# Patient Record
Sex: FEMALE | Race: BLACK OR AFRICAN AMERICAN | NOT HISPANIC OR LATINO | URBAN - METROPOLITAN AREA
[De-identification: names, ages, dates, MRNs, and addresses within clinical notes are randomized per-mention and may not be internally consistent; named-entity substitution may affect disease eponyms.]

---

## 2021-07-07 ENCOUNTER — OUTPATIENT (OUTPATIENT)
Dept: OUTPATIENT SERVICES | Facility: HOSPITAL | Age: 36
LOS: 1 days | Discharge: HOME | End: 2021-07-07

## 2021-07-07 ENCOUNTER — NON-APPOINTMENT (OUTPATIENT)
Age: 36
End: 2021-07-07

## 2021-07-07 ENCOUNTER — APPOINTMENT (OUTPATIENT)
Dept: OBGYN | Facility: CLINIC | Age: 36
End: 2021-07-07
Payer: MEDICAID

## 2021-07-07 ENCOUNTER — RESULT CHARGE (OUTPATIENT)
Age: 36
End: 2021-07-07

## 2021-07-07 VITALS
WEIGHT: 232 LBS | SYSTOLIC BLOOD PRESSURE: 120 MMHG | BODY MASS INDEX: 35.16 KG/M2 | DIASTOLIC BLOOD PRESSURE: 64 MMHG | HEIGHT: 68 IN

## 2021-07-07 DIAGNOSIS — Z34.90 ENCOUNTER FOR SUPERVISION OF NORMAL PREGNANCY, UNSPECIFIED, UNSPECIFIED TRIMESTER: ICD-10-CM

## 2021-07-07 PROCEDURE — 99204 OFFICE O/P NEW MOD 45 MIN: CPT

## 2021-07-08 ENCOUNTER — NON-APPOINTMENT (OUTPATIENT)
Age: 36
End: 2021-07-08

## 2021-07-08 LAB
BILIRUB UR QL STRIP: NORMAL
CLARITY UR: CLEAR
COLLECTION METHOD: NORMAL
GLUCOSE UR-MCNC: NORMAL
HCG UR QL: 0.2 EU/DL
HGB UR QL STRIP.AUTO: NORMAL
KETONES UR-MCNC: NORMAL
LEUKOCYTE ESTERASE UR QL STRIP: NORMAL
NITRITE UR QL STRIP: NORMAL
PH UR STRIP: 6
PROT UR STRIP-MCNC: NORMAL
SP GR UR STRIP: 1.02

## 2021-07-17 LAB
A VAGINAE DNA VAG QL NAA+PROBE: NORMAL
BVAB2 DNA VAG QL NAA+PROBE: NORMAL
C KRUSEI DNA VAG QL NAA+PROBE: NEGATIVE
C TRACH RRNA SPEC QL NAA+PROBE: NEGATIVE
CYTOLOGY CVX/VAG DOC THIN PREP: NORMAL
MEGA1 DNA VAG QL NAA+PROBE: NORMAL
N GONORRHOEA RRNA SPEC QL NAA+PROBE: NEGATIVE
T VAGINALIS RRNA SPEC QL NAA+PROBE: NEGATIVE

## 2021-07-21 ENCOUNTER — NON-APPOINTMENT (OUTPATIENT)
Age: 36
End: 2021-07-21

## 2021-07-21 ENCOUNTER — APPOINTMENT (OUTPATIENT)
Dept: OBGYN | Facility: CLINIC | Age: 36
End: 2021-07-21
Payer: MEDICAID

## 2021-07-21 ENCOUNTER — OUTPATIENT (OUTPATIENT)
Dept: OUTPATIENT SERVICES | Facility: HOSPITAL | Age: 36
LOS: 1 days | Discharge: HOME | End: 2021-07-21

## 2021-07-21 ENCOUNTER — RESULT CHARGE (OUTPATIENT)
Age: 36
End: 2021-07-21

## 2021-07-21 VITALS — SYSTOLIC BLOOD PRESSURE: 118 MMHG | DIASTOLIC BLOOD PRESSURE: 64 MMHG

## 2021-07-21 VITALS
WEIGHT: 236.5 LBS | BODY MASS INDEX: 35.84 KG/M2 | SYSTOLIC BLOOD PRESSURE: 142 MMHG | DIASTOLIC BLOOD PRESSURE: 68 MMHG | HEIGHT: 68 IN

## 2021-07-21 PROCEDURE — 99213 OFFICE O/P EST LOW 20 MIN: CPT | Mod: 25

## 2021-07-21 PROCEDURE — 76815 OB US LIMITED FETUS(S): CPT | Mod: 26

## 2021-07-26 ENCOUNTER — OUTPATIENT (OUTPATIENT)
Dept: OUTPATIENT SERVICES | Facility: HOSPITAL | Age: 36
LOS: 1 days | Discharge: HOME | End: 2021-07-26

## 2021-07-26 ENCOUNTER — ASOB RESULT (OUTPATIENT)
Age: 36
End: 2021-07-26

## 2021-07-26 ENCOUNTER — APPOINTMENT (OUTPATIENT)
Dept: ANTEPARTUM | Facility: CLINIC | Age: 36
End: 2021-07-26
Payer: MEDICAID

## 2021-07-26 DIAGNOSIS — Z34.90 ENCOUNTER FOR SUPERVISION OF NORMAL PREGNANCY, UNSPECIFIED, UNSPECIFIED TRIMESTER: ICD-10-CM

## 2021-07-26 PROCEDURE — 76805 OB US >/= 14 WKS SNGL FETUS: CPT | Mod: 26

## 2021-07-28 ENCOUNTER — NON-APPOINTMENT (OUTPATIENT)
Age: 36
End: 2021-07-28

## 2021-07-28 LAB
ABO + RH PNL BLD: NORMAL
BACTERIA UR CULT: NORMAL
BLD GP AB SCN SERPL QL: NORMAL
GLUCOSE 1H P 50 G GLC PO SERPL-MCNC: 159 MG/DL
HBV SURFACE AG SER QL: NONREACTIVE
HIV1+2 AB SPEC QL IA.RAPID: NONREACTIVE
M TB IFN-G BLD-IMP: NEGATIVE
MEV IGG FLD QL IA: 109 AU/ML
MEV IGG+IGM SER-IMP: POSITIVE
QUANTIFERON TB PLUS MITOGEN MINUS NIL: 1.78 IU/ML
QUANTIFERON TB PLUS NIL: 0.01 IU/ML
QUANTIFERON TB PLUS TB1 MINUS NIL: 0 IU/ML
QUANTIFERON TB PLUS TB2 MINUS NIL: 0 IU/ML
RUBV IGG FLD-ACNC: 5.9 INDEX
RUBV IGG SER-IMP: POSITIVE
VZV AB TITR SER: POSITIVE
VZV IGG SER IF-ACNC: 1591 INDEX

## 2021-07-30 LAB — T PALLIDUM AB SER QL IA: NEGATIVE

## 2021-08-01 LAB
AR GENE MUT ANL BLD/T: NORMAL
FMR1 GENE MUT ANL BLD/T: NORMAL

## 2021-08-04 ENCOUNTER — APPOINTMENT (OUTPATIENT)
Dept: OBGYN | Facility: CLINIC | Age: 36
End: 2021-08-04
Payer: MEDICAID

## 2021-08-04 ENCOUNTER — NON-APPOINTMENT (OUTPATIENT)
Age: 36
End: 2021-08-04

## 2021-08-04 ENCOUNTER — OUTPATIENT (OUTPATIENT)
Dept: OUTPATIENT SERVICES | Facility: HOSPITAL | Age: 36
LOS: 1 days | Discharge: HOME | End: 2021-08-04

## 2021-08-04 VITALS
WEIGHT: 233 LBS | BODY MASS INDEX: 35.31 KG/M2 | SYSTOLIC BLOOD PRESSURE: 100 MMHG | HEIGHT: 68 IN | DIASTOLIC BLOOD PRESSURE: 60 MMHG

## 2021-08-04 PROCEDURE — 99213 OFFICE O/P EST LOW 20 MIN: CPT

## 2021-08-05 LAB
CFTR MUT TESTED BLD/T: NEGATIVE
GLUCOSE 1H P 100 G GLC PO SERPL-MCNC: 154 MG/DL
GLUCOSE 2H P CHAL SERPL-MCNC: 135 MG/DL
GLUCOSE 3H P CHAL SERPL-MCNC: 130 MG/DL
GLUCOSE BS SERPL-MCNC: 82 MG/DL

## 2021-08-18 ENCOUNTER — RESULT CHARGE (OUTPATIENT)
Age: 36
End: 2021-08-18

## 2021-08-18 ENCOUNTER — APPOINTMENT (OUTPATIENT)
Dept: OBGYN | Facility: CLINIC | Age: 36
End: 2021-08-18
Payer: MEDICAID

## 2021-08-18 ENCOUNTER — OUTPATIENT (OUTPATIENT)
Dept: OUTPATIENT SERVICES | Facility: HOSPITAL | Age: 36
LOS: 1 days | Discharge: HOME | End: 2021-08-18

## 2021-08-18 VITALS
BODY MASS INDEX: 35.92 KG/M2 | DIASTOLIC BLOOD PRESSURE: 62 MMHG | HEIGHT: 68 IN | WEIGHT: 237 LBS | SYSTOLIC BLOOD PRESSURE: 108 MMHG

## 2021-08-18 LAB
BILIRUB UR QL STRIP: NORMAL
CLARITY UR: CLEAR
COLLECTION METHOD: NORMAL
GLUCOSE UR-MCNC: NORMAL
HCG UR QL: 0.2 EU/DL
HGB UR QL STRIP.AUTO: NORMAL
KETONES UR-MCNC: NORMAL
LEUKOCYTE ESTERASE UR QL STRIP: NORMAL
NITRITE UR QL STRIP: NORMAL
PH UR STRIP: 5
PROT UR STRIP-MCNC: NORMAL
SP GR UR STRIP: 1

## 2021-08-18 PROCEDURE — 99213 OFFICE O/P EST LOW 20 MIN: CPT

## 2021-08-20 LAB
GP B STREP DNA SPEC QL NAA+PROBE: NORMAL
GP B STREP DNA SPEC QL NAA+PROBE: NOT DETECTED
SOURCE GBS: NORMAL

## 2021-08-23 DIAGNOSIS — Z34.90 ENCOUNTER FOR SUPERVISION OF NORMAL PREGNANCY, UNSPECIFIED, UNSPECIFIED TRIMESTER: ICD-10-CM

## 2021-08-23 LAB
BASOPHILS # BLD AUTO: 0.02 K/UL
BASOPHILS NFR BLD AUTO: 0.3 %
EOSINOPHIL # BLD AUTO: 0.04 K/UL
EOSINOPHIL NFR BLD AUTO: 0.7 %
HCT VFR BLD CALC: 37.5 %
HGB BLD-MCNC: 11.8 G/DL
IMM GRANULOCYTES NFR BLD AUTO: 1.3 %
LYMPHOCYTES # BLD AUTO: 1.47 K/UL
LYMPHOCYTES NFR BLD AUTO: 24.5 %
MAN DIFF?: NORMAL
MCHC RBC-ENTMCNC: 26.5 PG
MCHC RBC-ENTMCNC: 31.5 G/DL
MCV RBC AUTO: 84.3 FL
MONOCYTES # BLD AUTO: 0.64 K/UL
MONOCYTES NFR BLD AUTO: 10.7 %
NEUTROPHILS # BLD AUTO: 3.74 K/UL
NEUTROPHILS NFR BLD AUTO: 62.5 %
PLATELET # BLD AUTO: 229 K/UL
RBC # BLD: 4.45 M/UL
RBC # FLD: 14 %
WBC # FLD AUTO: 5.99 K/UL

## 2021-08-25 ENCOUNTER — ASOB RESULT (OUTPATIENT)
Age: 36
End: 2021-08-25

## 2021-08-25 ENCOUNTER — OUTPATIENT (OUTPATIENT)
Dept: OUTPATIENT SERVICES | Facility: HOSPITAL | Age: 36
LOS: 1 days | Discharge: HOME | End: 2021-08-25

## 2021-08-25 ENCOUNTER — RESULT CHARGE (OUTPATIENT)
Age: 36
End: 2021-08-25

## 2021-08-25 ENCOUNTER — APPOINTMENT (OUTPATIENT)
Dept: OBGYN | Facility: CLINIC | Age: 36
End: 2021-08-25
Payer: MEDICAID

## 2021-08-25 ENCOUNTER — APPOINTMENT (OUTPATIENT)
Dept: ANTEPARTUM | Facility: CLINIC | Age: 36
End: 2021-08-25
Payer: MEDICAID

## 2021-08-25 VITALS
HEIGHT: 68 IN | BODY MASS INDEX: 35.92 KG/M2 | SYSTOLIC BLOOD PRESSURE: 108 MMHG | DIASTOLIC BLOOD PRESSURE: 60 MMHG | WEIGHT: 237 LBS

## 2021-08-25 LAB
BILIRUB UR QL STRIP: NORMAL
CLARITY UR: CLEAR
COLLECTION METHOD: NORMAL
GLUCOSE UR-MCNC: NORMAL
HCG UR QL: 0.2 EU/DL
HGB UR QL STRIP.AUTO: NORMAL
KETONES UR-MCNC: NORMAL
LEUKOCYTE ESTERASE UR QL STRIP: NORMAL
NITRITE UR QL STRIP: NORMAL
PH UR STRIP: 6
PROT UR STRIP-MCNC: NORMAL
SP GR UR STRIP: 1.03

## 2021-08-25 PROCEDURE — 76816 OB US FOLLOW-UP PER FETUS: CPT | Mod: 26

## 2021-08-25 PROCEDURE — 99213 OFFICE O/P EST LOW 20 MIN: CPT

## 2021-08-27 DIAGNOSIS — O26.849 UTERINE SIZE-DATE DISCREPANCY, UNSPECIFIED TRIMESTER: ICD-10-CM

## 2021-08-27 DIAGNOSIS — Z36.89 ENCOUNTER FOR OTHER SPECIFIED ANTENATAL SCREENING: ICD-10-CM

## 2021-08-27 DIAGNOSIS — Z3A.36 36 WEEKS GESTATION OF PREGNANCY: ICD-10-CM

## 2021-08-30 DIAGNOSIS — Z36.89 ENCOUNTER FOR OTHER SPECIFIED ANTENATAL SCREENING: ICD-10-CM

## 2021-08-30 DIAGNOSIS — Z34.90 ENCOUNTER FOR SUPERVISION OF NORMAL PREGNANCY, UNSPECIFIED, UNSPECIFIED TRIMESTER: ICD-10-CM

## 2021-08-30 DIAGNOSIS — O26.849 UTERINE SIZE-DATE DISCREPANCY, UNSPECIFIED TRIMESTER: ICD-10-CM

## 2021-08-30 DIAGNOSIS — Z02.9 ENCOUNTER FOR ADMINISTRATIVE EXAMINATIONS, UNSPECIFIED: ICD-10-CM

## 2021-08-30 DIAGNOSIS — Z3A.36 36 WEEKS GESTATION OF PREGNANCY: ICD-10-CM

## 2021-09-01 ENCOUNTER — RESULT CHARGE (OUTPATIENT)
Age: 36
End: 2021-09-01

## 2021-09-01 ENCOUNTER — NON-APPOINTMENT (OUTPATIENT)
Age: 36
End: 2021-09-01

## 2021-09-01 ENCOUNTER — OUTPATIENT (OUTPATIENT)
Dept: OUTPATIENT SERVICES | Facility: HOSPITAL | Age: 36
LOS: 1 days | Discharge: HOME | End: 2021-09-01

## 2021-09-01 ENCOUNTER — APPOINTMENT (OUTPATIENT)
Dept: OBGYN | Facility: CLINIC | Age: 36
End: 2021-09-01
Payer: MEDICAID

## 2021-09-01 VITALS
BODY MASS INDEX: 36.07 KG/M2 | HEIGHT: 68 IN | SYSTOLIC BLOOD PRESSURE: 110 MMHG | DIASTOLIC BLOOD PRESSURE: 70 MMHG | WEIGHT: 238 LBS

## 2021-09-01 LAB
BILIRUB UR QL STRIP: NORMAL
CLARITY UR: CLEAR
COLLECTION METHOD: NORMAL
GLUCOSE UR-MCNC: NORMAL
HCG UR QL: 0.2 EU/DL
HGB UR QL STRIP.AUTO: NORMAL
KETONES UR-MCNC: NORMAL
LEUKOCYTE ESTERASE UR QL STRIP: NORMAL
NITRITE UR QL STRIP: NORMAL
PH UR STRIP: 7
PROT UR STRIP-MCNC: NORMAL
SP GR UR STRIP: 1.01

## 2021-09-01 PROCEDURE — 99213 OFFICE O/P EST LOW 20 MIN: CPT

## 2021-09-07 ENCOUNTER — NON-APPOINTMENT (OUTPATIENT)
Age: 36
End: 2021-09-07

## 2021-09-08 ENCOUNTER — APPOINTMENT (OUTPATIENT)
Dept: OBGYN | Facility: CLINIC | Age: 36
End: 2021-09-08
Payer: MEDICAID

## 2021-09-08 ENCOUNTER — OUTPATIENT (OUTPATIENT)
Dept: OUTPATIENT SERVICES | Facility: HOSPITAL | Age: 36
LOS: 1 days | Discharge: HOME | End: 2021-09-08

## 2021-09-08 ENCOUNTER — RESULT CHARGE (OUTPATIENT)
Age: 36
End: 2021-09-08

## 2021-09-08 VITALS
BODY MASS INDEX: 36.44 KG/M2 | SYSTOLIC BLOOD PRESSURE: 118 MMHG | WEIGHT: 240.44 LBS | DIASTOLIC BLOOD PRESSURE: 66 MMHG | HEIGHT: 68 IN

## 2021-09-08 PROCEDURE — 99213 OFFICE O/P EST LOW 20 MIN: CPT

## 2021-09-11 ENCOUNTER — OUTPATIENT (OUTPATIENT)
Dept: OUTPATIENT SERVICES | Facility: HOSPITAL | Age: 36
LOS: 1 days | Discharge: HOME | End: 2021-09-11

## 2021-09-11 ENCOUNTER — LABORATORY RESULT (OUTPATIENT)
Age: 36
End: 2021-09-11

## 2021-09-11 DIAGNOSIS — Z11.59 ENCOUNTER FOR SCREENING FOR OTHER VIRAL DISEASES: ICD-10-CM

## 2021-09-14 ENCOUNTER — INPATIENT (INPATIENT)
Facility: HOSPITAL | Age: 36
LOS: 1 days | Discharge: HOME | End: 2021-09-16
Attending: OBSTETRICS & GYNECOLOGY | Admitting: OBSTETRICS & GYNECOLOGY
Payer: MEDICAID

## 2021-09-14 ENCOUNTER — NON-APPOINTMENT (OUTPATIENT)
Age: 36
End: 2021-09-14

## 2021-09-14 VITALS — TEMPERATURE: 98 F

## 2021-09-14 DIAGNOSIS — Z98.891 HISTORY OF UTERINE SCAR FROM PREVIOUS SURGERY: Chronic | ICD-10-CM

## 2021-09-14 LAB
ALBUMIN SERPL ELPH-MCNC: 3.6 G/DL — SIGNIFICANT CHANGE UP (ref 3.5–5.2)
ALP SERPL-CCNC: 141 U/L — HIGH (ref 30–115)
ALT FLD-CCNC: 13 U/L — SIGNIFICANT CHANGE UP (ref 0–41)
AMPHET UR-MCNC: NEGATIVE — SIGNIFICANT CHANGE UP
ANION GAP SERPL CALC-SCNC: 12 MMOL/L — SIGNIFICANT CHANGE UP (ref 7–14)
APPEARANCE UR: CLEAR — SIGNIFICANT CHANGE UP
AST SERPL-CCNC: 15 U/L — SIGNIFICANT CHANGE UP (ref 0–41)
BARBITURATES UR SCN-MCNC: NEGATIVE — SIGNIFICANT CHANGE UP
BASOPHILS # BLD AUTO: 0.02 K/UL — SIGNIFICANT CHANGE UP (ref 0–0.2)
BASOPHILS NFR BLD AUTO: 0.3 % — SIGNIFICANT CHANGE UP (ref 0–1)
BENZODIAZ UR-MCNC: NEGATIVE — SIGNIFICANT CHANGE UP
BILIRUB SERPL-MCNC: 0.3 MG/DL — SIGNIFICANT CHANGE UP (ref 0.2–1.2)
BILIRUB UR-MCNC: NEGATIVE — SIGNIFICANT CHANGE UP
BLD GP AB SCN SERPL QL: SIGNIFICANT CHANGE UP
BUN SERPL-MCNC: 7 MG/DL — LOW (ref 10–20)
BUPRENORPHINE SCREEN, URINE RESULT: NEGATIVE — SIGNIFICANT CHANGE UP
CALCIUM SERPL-MCNC: 9.1 MG/DL — SIGNIFICANT CHANGE UP (ref 8.5–10.1)
CHLORIDE SERPL-SCNC: 104 MMOL/L — SIGNIFICANT CHANGE UP (ref 98–110)
CO2 SERPL-SCNC: 19 MMOL/L — SIGNIFICANT CHANGE UP (ref 17–32)
COCAINE METAB.OTHER UR-MCNC: NEGATIVE — SIGNIFICANT CHANGE UP
COLOR SPEC: SIGNIFICANT CHANGE UP
CREAT ?TM UR-MCNC: 68 MG/DL — SIGNIFICANT CHANGE UP
CREAT SERPL-MCNC: 0.5 MG/DL — LOW (ref 0.7–1.5)
DIFF PNL FLD: NEGATIVE — SIGNIFICANT CHANGE UP
EOSINOPHIL # BLD AUTO: 0.02 K/UL — SIGNIFICANT CHANGE UP (ref 0–0.7)
EOSINOPHIL NFR BLD AUTO: 0.3 % — SIGNIFICANT CHANGE UP (ref 0–8)
FENTANYL UR QL: NEGATIVE — SIGNIFICANT CHANGE UP
GLUCOSE SERPL-MCNC: 91 MG/DL — SIGNIFICANT CHANGE UP (ref 70–99)
GLUCOSE UR QL: NEGATIVE — SIGNIFICANT CHANGE UP
HCT VFR BLD CALC: 40.8 % — SIGNIFICANT CHANGE UP (ref 37–47)
HGB BLD-MCNC: 12.8 G/DL — SIGNIFICANT CHANGE UP (ref 12–16)
IMM GRANULOCYTES NFR BLD AUTO: 0.8 % — HIGH (ref 0.1–0.3)
KETONES UR-MCNC: NEGATIVE — SIGNIFICANT CHANGE UP
L&D DRUG SCREEN, URINE: SIGNIFICANT CHANGE UP
LDH SERPL L TO P-CCNC: 147 — SIGNIFICANT CHANGE UP (ref 50–242)
LEUKOCYTE ESTERASE UR-ACNC: NEGATIVE — SIGNIFICANT CHANGE UP
LYMPHOCYTES # BLD AUTO: 1.67 K/UL — SIGNIFICANT CHANGE UP (ref 1.2–3.4)
LYMPHOCYTES # BLD AUTO: 28 % — SIGNIFICANT CHANGE UP (ref 20.5–51.1)
MCHC RBC-ENTMCNC: 25.7 PG — LOW (ref 27–31)
MCHC RBC-ENTMCNC: 31.4 G/DL — LOW (ref 32–37)
MCV RBC AUTO: 81.8 FL — SIGNIFICANT CHANGE UP (ref 81–99)
METHADONE UR-MCNC: NEGATIVE — SIGNIFICANT CHANGE UP
MONOCYTES # BLD AUTO: 0.7 K/UL — HIGH (ref 0.1–0.6)
MONOCYTES NFR BLD AUTO: 11.7 % — HIGH (ref 1.7–9.3)
NEUTROPHILS # BLD AUTO: 3.5 K/UL — SIGNIFICANT CHANGE UP (ref 1.4–6.5)
NEUTROPHILS NFR BLD AUTO: 58.9 % — SIGNIFICANT CHANGE UP (ref 42.2–75.2)
NITRITE UR-MCNC: NEGATIVE — SIGNIFICANT CHANGE UP
NRBC # BLD: 0 /100 WBCS — SIGNIFICANT CHANGE UP (ref 0–0)
OPIATES UR-MCNC: NEGATIVE — SIGNIFICANT CHANGE UP
OXYCODONE UR-MCNC: NEGATIVE — SIGNIFICANT CHANGE UP
PCP UR-MCNC: NEGATIVE — SIGNIFICANT CHANGE UP
PH UR: 7 — SIGNIFICANT CHANGE UP (ref 5–8)
PLATELET # BLD AUTO: 243 K/UL — SIGNIFICANT CHANGE UP (ref 130–400)
POTASSIUM SERPL-MCNC: 4.1 MMOL/L — SIGNIFICANT CHANGE UP (ref 3.5–5)
POTASSIUM SERPL-SCNC: 4.1 MMOL/L — SIGNIFICANT CHANGE UP (ref 3.5–5)
PRENATAL SYPHILIS TEST: SIGNIFICANT CHANGE UP
PROPOXYPHENE QUALITATIVE URINE RESULT: NEGATIVE — SIGNIFICANT CHANGE UP
PROT ?TM UR-MCNC: 11 MG/DLG/24H — SIGNIFICANT CHANGE UP
PROT SERPL-MCNC: 6.4 G/DL — SIGNIFICANT CHANGE UP (ref 6–8)
PROT UR-MCNC: SIGNIFICANT CHANGE UP
PROT/CREAT UR-RTO: 0.2 RATIO — SIGNIFICANT CHANGE UP (ref 0–0.2)
RBC # BLD: 4.99 M/UL — SIGNIFICANT CHANGE UP (ref 4.2–5.4)
RBC # FLD: 14.1 % — SIGNIFICANT CHANGE UP (ref 11.5–14.5)
SODIUM SERPL-SCNC: 135 MMOL/L — SIGNIFICANT CHANGE UP (ref 135–146)
SP GR SPEC: 1.01 — SIGNIFICANT CHANGE UP (ref 1.01–1.03)
URATE SERPL-MCNC: 3.2 MG/DL — SIGNIFICANT CHANGE UP (ref 2.5–7)
UROBILINOGEN FLD QL: SIGNIFICANT CHANGE UP
WBC # BLD: 5.96 K/UL — SIGNIFICANT CHANGE UP (ref 4.8–10.8)
WBC # FLD AUTO: 5.96 K/UL — SIGNIFICANT CHANGE UP (ref 4.8–10.8)

## 2021-09-14 PROCEDURE — 59514 CESAREAN DELIVERY ONLY: CPT | Mod: U9

## 2021-09-14 PROCEDURE — 58300 INSERT INTRAUTERINE DEVICE: CPT | Mod: 59

## 2021-09-14 RX ORDER — ONDANSETRON 8 MG/1
4 TABLET, FILM COATED ORAL ONCE
Refills: 0 | Status: DISCONTINUED | OUTPATIENT
Start: 2021-09-14 | End: 2021-09-14

## 2021-09-14 RX ORDER — SODIUM CHLORIDE 9 MG/ML
1000 INJECTION, SOLUTION INTRAVENOUS
Refills: 0 | Status: DISCONTINUED | OUTPATIENT
Start: 2021-09-14 | End: 2021-09-16

## 2021-09-14 RX ORDER — SIMETHICONE 80 MG/1
80 TABLET, CHEWABLE ORAL EVERY 4 HOURS
Refills: 0 | Status: DISCONTINUED | OUTPATIENT
Start: 2021-09-14 | End: 2021-09-16

## 2021-09-14 RX ORDER — OXYCODONE HYDROCHLORIDE 5 MG/1
5 TABLET ORAL
Refills: 0 | Status: DISCONTINUED | OUTPATIENT
Start: 2021-09-14 | End: 2021-09-16

## 2021-09-14 RX ORDER — DIPHENHYDRAMINE HCL 50 MG
25 CAPSULE ORAL EVERY 6 HOURS
Refills: 0 | Status: DISCONTINUED | OUTPATIENT
Start: 2021-09-14 | End: 2021-09-16

## 2021-09-14 RX ORDER — CEFAZOLIN SODIUM 1 G
2000 VIAL (EA) INJECTION ONCE
Refills: 0 | Status: COMPLETED | OUTPATIENT
Start: 2021-09-14 | End: 2021-09-14

## 2021-09-14 RX ORDER — DEXAMETHASONE 0.5 MG/5ML
4 ELIXIR ORAL EVERY 6 HOURS
Refills: 0 | Status: DISCONTINUED | OUTPATIENT
Start: 2021-09-14 | End: 2021-09-14

## 2021-09-14 RX ORDER — OXYTOCIN 10 UNIT/ML
333.33 VIAL (ML) INJECTION
Qty: 20 | Refills: 0 | Status: DISCONTINUED | OUTPATIENT
Start: 2021-09-14 | End: 2021-09-16

## 2021-09-14 RX ORDER — NALOXONE HYDROCHLORIDE 4 MG/.1ML
0.1 SPRAY NASAL
Refills: 0 | Status: DISCONTINUED | OUTPATIENT
Start: 2021-09-14 | End: 2021-09-14

## 2021-09-14 RX ORDER — ENOXAPARIN SODIUM 100 MG/ML
40 INJECTION SUBCUTANEOUS EVERY 24 HOURS
Refills: 0 | Status: DISCONTINUED | OUTPATIENT
Start: 2021-09-15 | End: 2021-09-16

## 2021-09-14 RX ORDER — ACETAMINOPHEN 500 MG
975 TABLET ORAL
Refills: 0 | Status: DISCONTINUED | OUTPATIENT
Start: 2021-09-14 | End: 2021-09-16

## 2021-09-14 RX ORDER — MAGNESIUM HYDROXIDE 400 MG/1
30 TABLET, CHEWABLE ORAL
Refills: 0 | Status: DISCONTINUED | OUTPATIENT
Start: 2021-09-14 | End: 2021-09-16

## 2021-09-14 RX ORDER — KETOROLAC TROMETHAMINE 30 MG/ML
30 SYRINGE (ML) INJECTION EVERY 6 HOURS
Refills: 0 | Status: DISCONTINUED | OUTPATIENT
Start: 2021-09-14 | End: 2021-09-15

## 2021-09-14 RX ORDER — MORPHINE SULFATE 50 MG/1
2 CAPSULE, EXTENDED RELEASE ORAL ONCE
Refills: 0 | Status: DISCONTINUED | OUTPATIENT
Start: 2021-09-14 | End: 2021-09-14

## 2021-09-14 RX ORDER — CITRIC ACID/SODIUM CITRATE 300-500 MG
30 SOLUTION, ORAL ORAL ONCE
Refills: 0 | Status: COMPLETED | OUTPATIENT
Start: 2021-09-14 | End: 2021-09-14

## 2021-09-14 RX ORDER — SODIUM CHLORIDE 9 MG/ML
1000 INJECTION, SOLUTION INTRAVENOUS
Refills: 0 | Status: DISCONTINUED | OUTPATIENT
Start: 2021-09-14 | End: 2021-09-14

## 2021-09-14 RX ORDER — LANOLIN
1 OINTMENT (GRAM) TOPICAL EVERY 6 HOURS
Refills: 0 | Status: DISCONTINUED | OUTPATIENT
Start: 2021-09-14 | End: 2021-09-16

## 2021-09-14 RX ORDER — ONDANSETRON 8 MG/1
4 TABLET, FILM COATED ORAL EVERY 6 HOURS
Refills: 0 | Status: DISCONTINUED | OUTPATIENT
Start: 2021-09-14 | End: 2021-09-14

## 2021-09-14 RX ORDER — TETANUS TOXOID, REDUCED DIPHTHERIA TOXOID AND ACELLULAR PERTUSSIS VACCINE, ADSORBED 5; 2.5; 8; 8; 2.5 [IU]/.5ML; [IU]/.5ML; UG/.5ML; UG/.5ML; UG/.5ML
0.5 SUSPENSION INTRAMUSCULAR ONCE
Refills: 0 | Status: DISCONTINUED | OUTPATIENT
Start: 2021-09-14 | End: 2021-09-16

## 2021-09-14 RX ORDER — MORPHINE SULFATE 50 MG/1
2 CAPSULE, EXTENDED RELEASE ORAL
Refills: 0 | Status: DISCONTINUED | OUTPATIENT
Start: 2021-09-14 | End: 2021-09-14

## 2021-09-14 RX ORDER — OXYTOCIN 10 UNIT/ML
333.33 VIAL (ML) INJECTION
Qty: 20 | Refills: 0 | Status: DISCONTINUED | OUTPATIENT
Start: 2021-09-14 | End: 2021-09-14

## 2021-09-14 RX ORDER — FAMOTIDINE 10 MG/ML
20 INJECTION INTRAVENOUS ONCE
Refills: 0 | Status: COMPLETED | OUTPATIENT
Start: 2021-09-14 | End: 2021-09-14

## 2021-09-14 RX ORDER — OXYCODONE HYDROCHLORIDE 5 MG/1
5 TABLET ORAL ONCE
Refills: 0 | Status: DISCONTINUED | OUTPATIENT
Start: 2021-09-14 | End: 2021-09-16

## 2021-09-14 RX ORDER — HYDROMORPHONE HYDROCHLORIDE 2 MG/ML
0.5 INJECTION INTRAMUSCULAR; INTRAVENOUS; SUBCUTANEOUS
Refills: 0 | Status: DISCONTINUED | OUTPATIENT
Start: 2021-09-14 | End: 2021-09-14

## 2021-09-14 RX ORDER — SODIUM CHLORIDE 9 MG/ML
1000 INJECTION, SOLUTION INTRAVENOUS ONCE
Refills: 0 | Status: DISCONTINUED | OUTPATIENT
Start: 2021-09-14 | End: 2021-09-14

## 2021-09-14 RX ORDER — IBUPROFEN 200 MG
600 TABLET ORAL EVERY 6 HOURS
Refills: 0 | Status: COMPLETED | OUTPATIENT
Start: 2021-09-14 | End: 2022-08-13

## 2021-09-14 RX ADMIN — Medication 975 MILLIGRAM(S): at 20:16

## 2021-09-14 RX ADMIN — Medication 30 MILLIGRAM(S): at 23:17

## 2021-09-14 RX ADMIN — Medication 975 MILLIGRAM(S): at 19:35

## 2021-09-14 RX ADMIN — Medication 30 MILLILITER(S): at 15:17

## 2021-09-14 RX ADMIN — Medication 100 MILLIGRAM(S): at 15:18

## 2021-09-14 RX ADMIN — Medication 30 MILLIGRAM(S): at 22:12

## 2021-09-14 RX ADMIN — FAMOTIDINE 20 MILLIGRAM(S): 10 INJECTION INTRAVENOUS at 15:18

## 2021-09-14 NOTE — OB RN INTRAOPERATIVE NOTE - NSSELHIDDEN_OBGYN_ALL_OB_FT
[NS_DeliveryAttending1_OBGYN_ALL_OB_FT:[id number]=],[NS_DeliveryRN_OBGYN_ALL_OB_FT:[id number]] [NS_DeliveryAttending1_OBGYN_ALL_OB_FT:[id number]=],[NS_DeliveryRN_OBGYN_ALL_OB_FT:[id number]=],[NS_DeliveryAssist1_OBGYN_ALL_OB_FT:[id number]=],[NS_CirculateRN2_OBGYN_ALL_OB_FT:[email or web address]=]

## 2021-09-14 NOTE — OB PROVIDER H&P - ASSESSMENT
A/P: 36yo  at 39w5d GA, Rh pos, GBS neg; elevated GCT with normal GTT; late transfer of care from [place], scheduled for repeat  with Mirena IUD insertion  -discussed risks, benefits, and alternatives of    -dicussed the risks of bleeding, infection, injury to surrounding organs and structures, and potential need for a blood transfusion   -admit to L&D  -NPO   -IVF hydration   -ancef   -nole   -skin prep   -pepcid, bicitra, reglan per routine  -peds notified of admission  -on call to OR  -anesthesia notified of admission    Dr. Katty pina

## 2021-09-14 NOTE — BRIEF OPERATIVE NOTE - NSICDXBRIEFPROCEDURE_GEN_ALL_CORE_FT
PROCEDURES:  Repeat low transverse  section 14-Sep-2021 17:16:32  Tiffany Ramon   PROCEDURES:  Repeat low transverse  section 14-Sep-2021 17:16:32  Tiffany Ramon  Insertion of Mirena IUD 14-Sep-2021 17:25:16  Tiffany Ramon

## 2021-09-14 NOTE — BRIEF OPERATIVE NOTE - NSICDXBRIEFPREOP_GEN_ALL_CORE_FT
PRE-OP DIAGNOSIS:  39 weeks gestation of pregnancy 14-Sep-2021 17:16:41  Tiffany Ramon  Class 2 severe obesity with serious comorbidity in adult, unspecified BMI, unspecified obesity type 14-Sep-2021 17:17:02  Tiffany Ramon  Macrosomia 14-Sep-2021 17:18:05  Tiffany Ramon   PRE-OP DIAGNOSIS:  39 weeks gestation of pregnancy 14-Sep-2021 17:16:41  Tiffany Ramon  Macrosomia 14-Sep-2021 17:18:05  Tiffany Ramon  Severe obesity 14-Sep-2021 17:25:00  Tiffany Ramon

## 2021-09-14 NOTE — OB RN DELIVERY SUMMARY - NSSELHIDDEN_OBGYN_ALL_OB_FT
[NS_DeliveryAttending1_OBGYN_ALL_OB_FT:[id number]=],[NS_DeliveryRN_OBGYN_ALL_OB_FT:[id number]=],[NS_DeliveryAssist1_OBGYN_ALL_OB_FT:[id number]=],[NS_CirculateRN2_OBGYN_ALL_OB_FT:[email or web address]=]

## 2021-09-14 NOTE — OB PROVIDER H&P - NSHPPHYSICALEXAM_GEN_ALL_CORE
Vital Signs Last 24 Hrs  T(F): 98.3 (14 Sep 2021 08:58), Max: 98.3 (14 Sep 2021 08:58)  HR: 103 (14 Sep 2021 09:05) (103 - 103)  BP: 150/92 (14 Sep 2021 09:05) (150/92 - 150/92)    Physical Exam:   GA: AOX3, no apparent distress  Resp: CTAB  Cardio: RRR  Abd: soft, nontender, no palpable ctx, gravid  Extr: no erythema or pain to palpation bilaterally   SVE: Deferred    EFM: 140bpm/moderate varibiability/+accels   [place]: irregular  BSS: cephalic

## 2021-09-14 NOTE — OB PROVIDER DELIVERY SUMMARY - NSSELHIDDEN_OBGYN_ALL_OB_FT
[NS_DeliveryAttending1_OBGYN_ALL_OB_FT:[id number]=],[NS_DeliveryRN_OBGYN_ALL_OB_FT:[id number]=],[NS_DeliveryAssist1_OBGYN_ALL_OB_FT:[id number]=],[NS_CirculateRN2_OBGYN_ALL_OB_FT:[email or web address]=]

## 2021-09-14 NOTE — BRIEF OPERATIVE NOTE - OPERATION/FINDINGS
Delivery of viable female infant in the cephalic position with APGARs 9/9. Normal appearing uterus with normal appearing bilateral fallopian tubes and ovaries Delivery of viable female infant in the cephalic position with APGARs 9/9. Normal appearing uterus with normal appearing bilateral fallopian tubes and ovaries  Mirena IUD inserted (Lot [id number], Exp Dec 2023)

## 2021-09-14 NOTE — OB PROVIDER H&P - ATTENDING COMMENTS
Pt admitted for repeat c/s and insertion of mirena iud. R/B/A discussed including bleeding, infection and injury to bladder and intestine. Pt on call to the or.

## 2021-09-14 NOTE — BRIEF OPERATIVE NOTE - NSICDXBRIEFPOSTOP_GEN_ALL_CORE_FT
POST-OP DIAGNOSIS:  LGA (large for gestational age) infant 14-Sep-2021 17:17:48  Tiffany Ramon  Class 2 severe obesity with serious comorbidity in adult, unspecified BMI, unspecified obesity type 14-Sep-2021 17:17:53  Tiffany Ramon   infant of 39 completed weeks of gestation 14-Sep-2021 17:17:46  Tiffany Ramon   POST-OP DIAGNOSIS:  Severe obesity 14-Sep-2021 17:25:06  Tiffany Ramon  LGA (large for gestational age) infant 14-Sep-2021 17:17:48  Tiffany Ramon   infant of 39 completed weeks of gestation 14-Sep-2021 17:17:46  Tiffany Ramon

## 2021-09-14 NOTE — OB PROVIDER H&P - NS_OBGYNHISTORY_OBGYN_ALL_OB_FT
OB Hx: ) 2013, FT LTCS, 7-4  G2) 2020, repeat LTCS, 7-5;  death at day 2 due to meconium aspiration syndrome   G3) current     Gyn Hx: Denies h/o abnormal pap, STI, ovarian cysts, or fibroids

## 2021-09-14 NOTE — OB PROVIDER H&P - HISTORY OF PRESENT ILLNESS
36yo  at 39w5d GA dated by LMP c/w first tri sono presents to L&D for scheduled repeat . Reports good fetal movement. Denies vaginal bleeding, leakage of fluid, or contractions. GBS neg. Elevated GCT but normal GTT. Late transfer of prenatal care from [place]    34yo  at 39w5d GA dated by LMP c/w first tri sono presents to L&D for scheduled repeat . Reports good fetal movement. Denies vaginal bleeding, leakage of fluid, or contractions. GBS neg. Elevated GCT but normal GTT. H/o macrosomia, measuring 99% at 36 weeks gestational age. Late transfer of prenatal care from [place]

## 2021-09-14 NOTE — OB PROVIDER DELIVERY SUMMARY - NSPROVIDERDELIVERYNOTE_OBGYN_ALL_OB_FT
Delivery of viable female infant in the cephalic position with APGARs 9/9. Normal appearing uterus with normal appearing bilateral fallopian tubes and ovaries. Please see operative dictation for full delivery summary

## 2021-09-14 NOTE — OB PROVIDER H&P - NSHPLABSRESULTS_GEN_ALL_CORE
Labs:  HbSAg: neg   HIV: neg   RPR: neg   Blood Type: B pos   Antibody Screen: neg   Rubella: immune  Varicella: immune  Measles: immune  GCT: 159  GTT: 82/154/135/130  GBS: neg       Sonos:   @32w4d: EFW 2554g (97%), vtx, anterior placenta, MVP 6.11cm  @36w6d: EFW 3826g (99%), vtx, anterior placenta, MVP 6.7cm, BPP 8/8

## 2021-09-14 NOTE — H&P NEWBORN. - NSNBPERINATALHXFT_GEN_N_CORE
Term female infant born at 39 weeks and 5 days via  delivery to a 34 year old, H0W1cjstsi. Apgars were 9 and 9 at 1 and 5 minutes respectively. Infant was LGA. Prenatal labs were negative. Maternal blood type B+.     PHYSICAL EXAM  General: Infant appears active, with normal color, normal  cry.  Skin: Intact, no lesions, no jaundice.  Head: Scalp is normal with open, soft, flat fontanels, normal sutures, no edema or hematoma.  EENT: Eyes with nl light reflex b/l, sclera clear, Ears symmetric, cartilage well formed, no pits or tags, Nares patent b/l, palate intact, lips and tongue normal.  Cardiovascular: Strong, regular heart beat with no murmur, PMI normal, 2+ b/l femoral pulses. Thorax appears symmetric.  Respiratory: Normal spontaneous respirations with no retractions, clear to auscultation b/l.  Abdominal: Soft, normal bowel sounds, no masses palpated, no spleen palpated, umbilicus nl with 2 art 1 vein.  Back: Spine normal with no midline defects, anus patent.  Hips: Hips normal b/l, neg ortalani,  neg cosby  Musculoskeletal: Ext normal x 4, 10 fingers 10 toes b/l. No clavicular crepitus or tenderness.  Neurology: Good tone, no lethargy, normal cry, suck, grasp, estefani, gag, swallow.  Genitalia: Male - penis present, central urethral opening, testes descended bilaterally. Female - normal vaginal introitus, labia majora present not fused

## 2021-09-15 ENCOUNTER — APPOINTMENT (OUTPATIENT)
Dept: OBGYN | Facility: CLINIC | Age: 36
End: 2021-09-15

## 2021-09-15 LAB
BASOPHILS # BLD AUTO: 0.03 K/UL — SIGNIFICANT CHANGE UP (ref 0–0.2)
BASOPHILS NFR BLD AUTO: 0.2 % — SIGNIFICANT CHANGE UP (ref 0–1)
EOSINOPHIL # BLD AUTO: 0.01 K/UL — SIGNIFICANT CHANGE UP (ref 0–0.7)
EOSINOPHIL NFR BLD AUTO: 0.1 % — SIGNIFICANT CHANGE UP (ref 0–8)
HCT VFR BLD CALC: 38.7 % — SIGNIFICANT CHANGE UP (ref 37–47)
HGB BLD-MCNC: 11.9 G/DL — LOW (ref 12–16)
IMM GRANULOCYTES NFR BLD AUTO: 0.6 % — HIGH (ref 0.1–0.3)
LYMPHOCYTES # BLD AUTO: 1.43 K/UL — SIGNIFICANT CHANGE UP (ref 1.2–3.4)
LYMPHOCYTES # BLD AUTO: 10.8 % — LOW (ref 20.5–51.1)
MCHC RBC-ENTMCNC: 25.6 PG — LOW (ref 27–31)
MCHC RBC-ENTMCNC: 30.7 G/DL — LOW (ref 32–37)
MCV RBC AUTO: 83.2 FL — SIGNIFICANT CHANGE UP (ref 81–99)
MONOCYTES # BLD AUTO: 1.4 K/UL — HIGH (ref 0.1–0.6)
MONOCYTES NFR BLD AUTO: 10.5 % — HIGH (ref 1.7–9.3)
NEUTROPHILS # BLD AUTO: 10.33 K/UL — HIGH (ref 1.4–6.5)
NEUTROPHILS NFR BLD AUTO: 77.8 % — HIGH (ref 42.2–75.2)
NRBC # BLD: 0 /100 WBCS — SIGNIFICANT CHANGE UP (ref 0–0)
PLATELET # BLD AUTO: 216 K/UL — SIGNIFICANT CHANGE UP (ref 130–400)
RBC # BLD: 4.65 M/UL — SIGNIFICANT CHANGE UP (ref 4.2–5.4)
RBC # FLD: 14.3 % — SIGNIFICANT CHANGE UP (ref 11.5–14.5)
WBC # BLD: 13.28 K/UL — HIGH (ref 4.8–10.8)
WBC # FLD AUTO: 13.28 K/UL — HIGH (ref 4.8–10.8)

## 2021-09-15 PROCEDURE — 99231 SBSQ HOSP IP/OBS SF/LOW 25: CPT

## 2021-09-15 RX ORDER — IBUPROFEN 200 MG
600 TABLET ORAL EVERY 6 HOURS
Refills: 0 | Status: DISCONTINUED | OUTPATIENT
Start: 2021-09-15 | End: 2021-09-16

## 2021-09-15 RX ADMIN — SIMETHICONE 80 MILLIGRAM(S): 80 TABLET, CHEWABLE ORAL at 01:06

## 2021-09-15 RX ADMIN — Medication 975 MILLIGRAM(S): at 21:01

## 2021-09-15 RX ADMIN — Medication 975 MILLIGRAM(S): at 09:40

## 2021-09-15 RX ADMIN — Medication 600 MILLIGRAM(S): at 17:50

## 2021-09-15 RX ADMIN — Medication 30 MILLIGRAM(S): at 16:34

## 2021-09-15 RX ADMIN — Medication 30 MILLIGRAM(S): at 11:32

## 2021-09-15 RX ADMIN — Medication 1 TABLET(S): at 11:32

## 2021-09-15 RX ADMIN — Medication 600 MILLIGRAM(S): at 23:38

## 2021-09-15 RX ADMIN — ENOXAPARIN SODIUM 40 MILLIGRAM(S): 100 INJECTION SUBCUTANEOUS at 01:06

## 2021-09-15 RX ADMIN — Medication 600 MILLIGRAM(S): at 17:02

## 2021-09-15 RX ADMIN — Medication 975 MILLIGRAM(S): at 01:06

## 2021-09-15 RX ADMIN — Medication 975 MILLIGRAM(S): at 22:00

## 2021-09-15 RX ADMIN — Medication 30 MILLIGRAM(S): at 06:40

## 2021-09-15 RX ADMIN — Medication 975 MILLIGRAM(S): at 01:42

## 2021-09-15 RX ADMIN — Medication 30 MILLIGRAM(S): at 06:58

## 2021-09-15 RX ADMIN — Medication 975 MILLIGRAM(S): at 09:15

## 2021-09-15 NOTE — PROGRESS NOTE ADULT - SUBJECTIVE AND OBJECTIVE BOX
PARVEZ MARIA  35y  Female    PGY2 Note:    Pt is POD#1. Pt is recovering well, no acute complaints. Pt denies heavy vaginal bleeding, pain well controlled on PO medications. Patient has not yet ambulated or passed flatus. Patient is tolerating liquids. Patient plans on breast feeding. Jimenez in place draining dark heriberto urine. Denies headache, chest pain, SOB, fever, chills, nausea, vomiting, extremity pain or swelling.       PAST MEDICAL & SURGICAL HISTORY:  No pertinent past medical history    History of  section      Physical Exam  Vital Signs Last 24 Hrs  T(C): 36.6 (14 Sep 2021 23:20), Max: 36.8 (14 Sep 2021 08:58)  T(F): 97.8 (14 Sep 2021 23:20), Max: 98.3 (14 Sep 2021 08:58)  HR: 93 (14 Sep 2021 23:20) (60 - 115)  BP: 109/59 (14 Sep 2021 23:20) (109/59 - 153/77)  RR: 18 (14 Sep 2021 23:20) (18 - 20)  SpO2: 98% (14 Sep 2021 19:07) (98% - 100%)    Gen: AAOx3, NAD  Heart: RRR, no M/R/G  Lungs: CTAB  Fundus: firm, below umbilicus, nontender   Wound: Pfannenstiel incision, Sterile dressing in place with sanguinous on 40% surface otherwise intact  Abd: Soft, appropriately tender near incision site, mildly distended, BS+  Lochia: minimal  Ext: No calf tenderness, no swelling    Labs:                        12.8   5.96  )-----------( 243      ( 14 Sep 2021 09:03 )             40.8      MEDICATIONS  (STANDING):  acetaminophen   Tablet .. 975 milliGRAM(s) Oral <User Schedule>  diphtheria/tetanus/pertussis (acellular) Vaccine (ADAcel) 0.5 milliLiter(s) IntraMuscular once  enoxaparin Injectable 40 milliGRAM(s) SubCutaneous every 24 hours  ibuprofen  Tablet. 600 milliGRAM(s) Oral every 6 hours  ketorolac   Injectable 30 milliGRAM(s) IV Push every 6 hours  lactated ringers. 1000 milliLiter(s) (125 mL/Hr) IV Continuous <Continuous>  oxytocin Infusion 333.333 milliUNIT(s)/Min (1000 mL/Hr) IV Continuous <Continuous>  prenatal multivitamin 1 Tablet(s) Oral daily    MEDICATIONS  (PRN):  diphenhydrAMINE 25 milliGRAM(s) Oral every 6 hours PRN Pruritus  lanolin Ointment 1 Application(s) Topical every 6 hours PRN Sore Nipples  magnesium hydroxide Suspension 30 milliLiter(s) Oral two times a day PRN Constipation  oxyCODONE    IR 5 milliGRAM(s) Oral every 3 hours PRN Moderate to Severe Pain (4-10)  oxyCODONE    IR 5 milliGRAM(s) Oral once PRN Moderate to Severe Pain (4-10)  simethicone 80 milliGRAM(s) Chew every 4 hours PRN Gas      UO (min: 54cc/hr): (1808-1326) 350cc, 9962-5116: 500cc,

## 2021-09-15 NOTE — PROGRESS NOTE ADULT - ASSESSMENT
A/PL 34yo now P2 POD#1 s/p repeat c/s(#2) with Mirena insertion with an EBL of 800cc, with gHTN, with blood pressure currently well controlled; doing well  - encourage ambulation  - encourage PO hydration  - encourage incentive spirometry use  - monitor vitals, bleeding   - simethicone & senna   - DVT ppx: lovenox & SCDs  - diet: regular   - indwelling urinary catheter discontinued. TOV by 1230  - continue IVF hydration until successful TOV  - pain mgmt prn   - f/u AM CBC   - VNS ordered for blood pressure monitoring at home for gestational hypertension     Dr. Alaniz aware A/P: 34yo now P2 POD#1 s/p repeat c/s(#2) with Mirena insertion with an EBL of 800cc, with gHTN, with blood pressure currently well controlled; doing well  - encourage ambulation  - encourage PO hydration  - encourage incentive spirometry use  - monitor vitals, bleeding   - simethicone & senna   - DVT ppx: lovenox & SCDs  - diet: regular   - indwelling urinary catheter discontinued. TOV by 1230  - continue IVF hydration until successful TOV  - pain mgmt prn   - f/u AM CBC   - VNS ordered for blood pressure monitoring at home for gestational hypertension     Dr. Alaniz aware

## 2021-09-15 NOTE — PROGRESS NOTE ADULT - SUBJECTIVE AND OBJECTIVE BOX
PARVEZ MARIA  35y  Female    PGY3 Note:    PO#1  Pt is recovering well, no acute complaints. Pt denies headache, chest pain, SOB, fever, chills, nausea, vomiting, extremity pain or swelling. Pt denies headaches, vision changes, palpitations or RUQ pain. Pt denies palpitations, shortness of breath, dizziness, or syncope.     Ambulating: No, SCDs on   Voiding: No, indwelling urinary catheter in place  Flatus: Yes  Bowel movements: No   Breast or bottle feeding: Both  Diet: Regular    MEDICATIONS  (STANDING):  acetaminophen   Tablet .. 975 milliGRAM(s) Oral <User Schedule>  diphtheria/tetanus/pertussis (acellular) Vaccine (ADAcel) 0.5 milliLiter(s) IntraMuscular once  enoxaparin Injectable 40 milliGRAM(s) SubCutaneous every 24 hours  ibuprofen  Tablet. 600 milliGRAM(s) Oral every 6 hours  ketorolac   Injectable 30 milliGRAM(s) IV Push every 6 hours  lactated ringers. 1000 milliLiter(s) (125 mL/Hr) IV Continuous <Continuous>  oxytocin Infusion 333.333 milliUNIT(s)/Min (1000 mL/Hr) IV Continuous <Continuous>  prenatal multivitamin 1 Tablet(s) Oral daily    MEDICATIONS  (PRN):  diphenhydrAMINE 25 milliGRAM(s) Oral every 6 hours PRN Pruritus  lanolin Ointment 1 Application(s) Topical every 6 hours PRN Sore Nipples  magnesium hydroxide Suspension 30 milliLiter(s) Oral two times a day PRN Constipation  oxyCODONE    IR 5 milliGRAM(s) Oral every 3 hours PRN Moderate to Severe Pain (4-10)  oxyCODONE    IR 5 milliGRAM(s) Oral once PRN Moderate to Severe Pain (4-10)  simethicone 80 milliGRAM(s) Chew every 4 hours PRN Gas      PAST MEDICAL & SURGICAL HISTORY:  No pertinent past medical history  History of  section    Physical Exam  Vital Signs Last 24 Hrs  T(C): 35.9 (15 Sep 2021 03:01), Max: 36.8 (14 Sep 2021 08:58)  T(F): 96.7 (15 Sep 2021 03:01), Max: 98.3 (14 Sep 2021 08:58)  HR: 92 (15 Sep 2021 03:01) (60 - 115)  BP: 115/62 (15 Sep 2021 03:01) (109/59 - 153/77)  RR: 18 (15 Sep 2021 03:01) (18 - 20)  SpO2: 98% (14 Sep 2021 19:07) (98% - 100%)    UO: 150cc (from 1435-8067), adequate    Gen: AAOx3, NAD  Heart: RRR  Lungs: CTAB  Fundus: firm, below umbilicus   Wound: steris in place; incision c/d/i with scant serosangious drainage on steri stripes   Abd: Soft, appropriately tender near incision site, mildly distended, BS+  Lochia: minimal rubra  Ext: No calf tenderness, no swelling    Labs:                        12.8   5.96  )-----------( 243      ( 14 Sep 2021 09:03 )             40.8         PARVEZ MARIA  35y  Female    PGY3 Note:    POD#1  Pt is recovering well, no acute complaints. Pt denies headache, chest pain, SOB, fever, chills, nausea, vomiting, extremity pain or swelling. Pt denies headaches, vision changes, palpitations or RUQ pain. Pt denies palpitations, shortness of breath, dizziness, or syncope.     Ambulating: No, SCDs on   Voiding: No, indwelling urinary catheter in place  Flatus: Yes  Bowel movements: No   Breast or bottle feeding: Both  Diet: Regular    MEDICATIONS  (STANDING):  acetaminophen   Tablet .. 975 milliGRAM(s) Oral <User Schedule>  diphtheria/tetanus/pertussis (acellular) Vaccine (ADAcel) 0.5 milliLiter(s) IntraMuscular once  enoxaparin Injectable 40 milliGRAM(s) SubCutaneous every 24 hours  ibuprofen  Tablet. 600 milliGRAM(s) Oral every 6 hours  ketorolac   Injectable 30 milliGRAM(s) IV Push every 6 hours  lactated ringers. 1000 milliLiter(s) (125 mL/Hr) IV Continuous <Continuous>  oxytocin Infusion 333.333 milliUNIT(s)/Min (1000 mL/Hr) IV Continuous <Continuous>  prenatal multivitamin 1 Tablet(s) Oral daily    MEDICATIONS  (PRN):  diphenhydrAMINE 25 milliGRAM(s) Oral every 6 hours PRN Pruritus  lanolin Ointment 1 Application(s) Topical every 6 hours PRN Sore Nipples  magnesium hydroxide Suspension 30 milliLiter(s) Oral two times a day PRN Constipation  oxyCODONE    IR 5 milliGRAM(s) Oral every 3 hours PRN Moderate to Severe Pain (4-10)  oxyCODONE    IR 5 milliGRAM(s) Oral once PRN Moderate to Severe Pain (4-10)  simethicone 80 milliGRAM(s) Chew every 4 hours PRN Gas      PAST MEDICAL & SURGICAL HISTORY:  No pertinent past medical history  History of  section    Physical Exam  Vital Signs Last 24 Hrs  T(C): 35.9 (15 Sep 2021 03:01), Max: 36.8 (14 Sep 2021 08:58)  T(F): 96.7 (15 Sep 2021 03:01), Max: 98.3 (14 Sep 2021 08:58)  HR: 92 (15 Sep 2021 03:01) (60 - 115)  BP: 115/62 (15 Sep 2021 03:01) (109/59 - 153/77)  RR: 18 (15 Sep 2021 03:01) (18 - 20)  SpO2: 98% (14 Sep 2021 19:07) (98% - 100%)    UO: 150cc (from 2098-4612), adequate    Gen: AAOx3, NAD  Heart: RRR  Lungs: CTAB  Fundus: firm, below umbilicus   Wound: steris in place; incision c/d/i with scant serosangious drainage on steri stripes   Abd: Soft, appropriately tender near incision site, mildly distended, BS+  Lochia: minimal rubra  Ext: No calf tenderness, no swelling    Labs:                        12.8   5.96  )-----------( 243      ( 14 Sep 2021 09:03 )             40.8

## 2021-09-15 NOTE — PROGRESS NOTE ADULT - ASSESSMENT
36yo P2 with h/o gHTN s/p repeat c/s (C/s#2) with Mirena insertion, , doing well  - continue routine post OP care  - encourage ambulation, PO hydration  - monitor vitals q4hrs, bleeding   - simethicone   - regular diet  - DVT prophylaxis: SCDs, lovenox  - pain mgmt prn   - f/u UO  - f/u AM CBC     Dr. Wilson and Dr. Grande to be made aware

## 2021-09-16 ENCOUNTER — TRANSCRIPTION ENCOUNTER (OUTPATIENT)
Age: 36
End: 2021-09-16

## 2021-09-16 VITALS
TEMPERATURE: 98 F | DIASTOLIC BLOOD PRESSURE: 65 MMHG | RESPIRATION RATE: 20 BRPM | SYSTOLIC BLOOD PRESSURE: 128 MMHG | HEART RATE: 97 BPM

## 2021-09-16 PROCEDURE — 99238 HOSP IP/OBS DSCHRG MGMT 30/<: CPT

## 2021-09-16 RX ORDER — IBUPROFEN 200 MG
1 TABLET ORAL
Qty: 40 | Refills: 0
Start: 2021-09-16 | End: 2021-09-25

## 2021-09-16 RX ORDER — SENNA PLUS 8.6 MG/1
1 TABLET ORAL
Qty: 14 | Refills: 0
Start: 2021-09-16 | End: 2021-09-22

## 2021-09-16 RX ORDER — ACETAMINOPHEN 500 MG
3 TABLET ORAL
Qty: 84 | Refills: 0
Start: 2021-09-16 | End: 2021-09-22

## 2021-09-16 RX ORDER — OXYCODONE HYDROCHLORIDE 5 MG/1
1 TABLET ORAL
Qty: 12 | Refills: 0
Start: 2021-09-16 | End: 2021-09-18

## 2021-09-16 RX ORDER — SIMETHICONE 80 MG/1
1 TABLET, CHEWABLE ORAL
Qty: 42 | Refills: 0
Start: 2021-09-16 | End: 2021-09-22

## 2021-09-16 RX ADMIN — Medication 600 MILLIGRAM(S): at 07:04

## 2021-09-16 RX ADMIN — Medication 975 MILLIGRAM(S): at 10:15

## 2021-09-16 RX ADMIN — Medication 600 MILLIGRAM(S): at 06:07

## 2021-09-16 RX ADMIN — Medication 600 MILLIGRAM(S): at 11:57

## 2021-09-16 RX ADMIN — ENOXAPARIN SODIUM 40 MILLIGRAM(S): 100 INJECTION SUBCUTANEOUS at 00:40

## 2021-09-16 RX ADMIN — Medication 600 MILLIGRAM(S): at 01:06

## 2021-09-16 RX ADMIN — Medication 975 MILLIGRAM(S): at 09:42

## 2021-09-16 RX ADMIN — Medication 1 TABLET(S): at 11:57

## 2021-09-16 NOTE — DISCHARGE NOTE OB - CARE PLAN
Principal Discharge DX:	 delivery delivered  Assessment and plan of treatment:	healthy mother and baby   1

## 2021-09-16 NOTE — DISCHARGE NOTE OB - REASON FOR REFUSAL (REFER PATIENT TO HEALTHCARE PROVIDER FOR FOLLOW-UP):
Follow-up- General Surgery   Mesfin Jaimes 73 y o  male MRN: [id number]  Unit/Bed#:  Encounter: [id number]    Assessment/Plan     Assessment:  Large left inguinal hernia  COPD on oxygen at home  Plan:  I advised the patient to undergo open repair of left inguinal hernia under IV sedation  The patient was recommended to take his usual COPD medication with a sip of water the morning of surgery  I discussed the operative procedure, risks, benefits and alternatives with the patient, he understood and agreed to proceed  History of Present Illness     HPI:  I had the pleasure of seeing Mesfin Jaimes in the office today for follow-up  The patient is a pleasant 73 y o  male who was seen in my office in February of this year for symptomatic left inguinal hernia  The patient had medical and pulmonary clearance  The patient still symptomatic from left inguinal hernia with pain and discomfort and the hernia is increasing in size  Review of Systems: The rest of the review of system total of 10 were negative except for the HPI      Historical Information   Past Medical History:   Diagnosis Date    COPD (chronic obstructive pulmonary disease) ([facility] [address] )     Pneumonia      Past Surgical History:   Procedure Laterality Date    CHOLECYSTECTOMY      EYE SURGERY       Social History   History   Alcohol Use No     Comment: socialy     History   Drug Use No     History   Smoking Status    Former Smoker    Years: 50 00    Types: Cigarettes    Quit date: 2/27/2013   Smokeless Tobacco    Never Used     Family History: non-contributory    Meds/Allergies   all medications and allergies reviewed  Allergies   Allergen Reactions    Penicillins Hives     Passed out     Prescriptions and Patient-Reported    BREO ELLIPTA            Dose, Route, Frequency: 1 puff, Inhalation, Daily       Umeclidinium Bromide (INCRUSE ELLIPTA) 62 5 MCG/INH AEPB            Dose, Route, Frequency: 1 puff, Inhalation, Daily       albuterol (PROVENTIL HFA,VENTOLIN HFA) 90 mcg/act inhaler            Dose, Route, Frequency: 2 puff, Inhalation, Every 6 hours PRN       albuterol (2 5 mg/3 mL) 0 083 % nebulizer solution            Dose, Route, Frequency: 2 5 mg, Nebulization, Every 4 hours PRN       esomeprazole (NexIUM) 20 mg capsule            Dose, Route, Frequency: 20 mg, Oral, Daily       enoxaparin (LOVENOX) 40 mg/0 4 mL ()            Dose, Route, Frequency: 40 mg, Subcutaneous, On call to O R        sertraline (ZOLOFT) 50 mg tablet            Dose, Route, Frequency: 50 mg, Oral, Daily            Objective     Current Vitals:   Blood Pressure: 112/62 (05/15/18 1334)  Pulse: 78 (05/15/18 1334)  Temperature: (!) 97 4 °F (36 3 °C) (05/15/18 133)  Temp Source: Oral (05/15/18 133)  Respirations: 17 (OXYGEN LEVEL 3) (05/15/18 1334)  Height: 5' 10" (177 8 cm) (05/15/18 1334)  Weight - Scale: 83 9 kg (185 lb 0 6 oz) (05/15/18 133)      Physical Exam   Constitutional: He is oriented to person, place, and time  He appears well-developed and well-nourished  No distress  HENT:   Head: Normocephalic  Mouth/Throat: No oropharyngeal exudate  Eyes: Pupils are equal, round, and reactive to light  No scleral icterus  Neck: Normal range of motion  Cardiovascular: Normal rate and regular rhythm  No murmur heard  Pulmonary/Chest: Effort normal and breath sounds normal  No respiratory distress  Abdominal: Soft  He exhibits no mass  There is no tenderness  Genitourinary:   Genitourinary Comments: Detailed examination of the right and left inguinal canal revealed the following: There is a moderate size left inguinal hernia  There is no right inguinal hernia  Musculoskeletal: He exhibits no edema or tenderness  Lymphadenopathy:     He has no cervical adenopathy  Neurological: He is alert and oriented to person, place, and time  No cranial nerve deficit  Skin: No rash noted  No erythema  Psychiatric: He has a normal mood and affect   His behavior is normal  patient does not want vaccine; VIS date 8/6/2021

## 2021-09-16 NOTE — DISCHARGE NOTE OB - CARE PROVIDER_API CALL
Karena Alaniz)  Obstetrics and Gynecology  [address] [place] [address]  Phone: [phone]  Fax: [phone]  Established Patient  Follow Up Time: 1 week

## 2021-09-16 NOTE — PROGRESS NOTE ADULT - SUBJECTIVE AND OBJECTIVE BOX
PARVEZ MARIA  35y  Female    PGY3 Note:    Pt is POD#**. Pt is recovering well, no acute complaints. Pt denies/complains of headache, chest pain, SOB, hardy, chills, nausea, vomiting, extermity pain or swelling.     gHTN or PreE: Pt denies/complains of headaches, vision changes, palpitations or RUQ pain.     drop in H&H: Pt denies/complains of headaches, palpitations, shortness of breath, dizziness, or syncope.     Ambulating: Yes  Voiding: Yes  Flatus: Yes  Bowel movements: Yes   Breast or bottle feeding:   Diet: Regular    MEDICATIONS  (STANDING):  acetaminophen   Tablet .. 975 milliGRAM(s) Oral <User Schedule>  diphtheria/tetanus/pertussis (acellular) Vaccine (ADAcel) 0.5 milliLiter(s) IntraMuscular once  enoxaparin Injectable 40 milliGRAM(s) SubCutaneous every 24 hours  ibuprofen  Tablet. 600 milliGRAM(s) Oral every 6 hours  lactated ringers. 1000 milliLiter(s) (125 mL/Hr) IV Continuous <Continuous>  oxytocin Infusion 333.333 milliUNIT(s)/Min (1000 mL/Hr) IV Continuous <Continuous>  prenatal multivitamin 1 Tablet(s) Oral daily    MEDICATIONS  (PRN):  diphenhydrAMINE 25 milliGRAM(s) Oral every 6 hours PRN Pruritus  lanolin Ointment 1 Application(s) Topical every 6 hours PRN Sore Nipples  magnesium hydroxide Suspension 30 milliLiter(s) Oral two times a day PRN Constipation  oxyCODONE    IR 5 milliGRAM(s) Oral every 3 hours PRN Moderate to Severe Pain (4-10)  oxyCODONE    IR 5 milliGRAM(s) Oral once PRN Moderate to Severe Pain (4-10)  simethicone 80 milliGRAM(s) Chew every 4 hours PRN Gas      PAST MEDICAL & SURGICAL HISTORY:  No pertinent past medical history    History of  section        Physical Exam  Vital Signs Last 24 Hrs  T(C): 35.9 (16 Sep 2021 03:33), Max: 36.8 (15 Sep 2021 23:24)  T(F): 96.6 (16 Sep 2021 03:33), Max: 98.2 (15 Sep 2021 23:24)  HR: 90 (16 Sep 2021 03:33) (90 - 99)  BP: 107/69 (16 Sep 2021 03:33) (107/69 - 122/57)  BP(mean): --  RR: 18 (16 Sep 2021 03:33) (18 - 18)  SpO2: --    UO:     Gen: AAOx3, NAD  Heart:   Lungs:   Fundus: firm, below umbilicus   Wound: steris in place c/d/i   Abd: Soft, appropriately tender near incision site, mildly distended, BS+  Lochia:   Ext: No calf tenderness, no swelling    Labs:                        11.9   13.28 )-----------( 216      ( 15 Sep 2021 06:59 )             38.7                         12.8   5.96  )-----------( 243      ( 14 Sep 2021 09:03 )             40.8        A/P: S/P C/S POD  , recovering well   - encourage ambulation  - encourage PO hydration  - encourage incentive spirometry use  - monitor vitals, bleeding   - simethicone & senna   - DVT ppx: lovenox & SCDs  - diet:   - indwelling urinary catheter?  - IVF?  - pain mgmt prn   - f/u AM CBC   - anticipate d/c home  PARVEZ MARIA  35y  Female    PGY3 Note:    POD#2  Pt is recovering well, no acute complaints. Pt denies headache, chest pain, SOB, fever, chills, nausea, vomiting, extremity pain or swelling. Pt denies headaches, palpitations, shortness of breath, dizziness, or syncope.     Ambulating: Yes  Voiding: Yes  Flatus: Yes  Bowel movements: No  Breast or bottle feeding: Both  Diet: Regular    MEDICATIONS  (STANDING):  acetaminophen   Tablet .. 975 milliGRAM(s) Oral <User Schedule>  diphtheria/tetanus/pertussis (acellular) Vaccine (ADAcel) 0.5 milliLiter(s) IntraMuscular once  enoxaparin Injectable 40 milliGRAM(s) SubCutaneous every 24 hours  ibuprofen  Tablet. 600 milliGRAM(s) Oral every 6 hours  lactated ringers. 1000 milliLiter(s) (125 mL/Hr) IV Continuous <Continuous>  oxytocin Infusion 333.333 milliUNIT(s)/Min (1000 mL/Hr) IV Continuous <Continuous>  prenatal multivitamin 1 Tablet(s) Oral daily    MEDICATIONS  (PRN):  diphenhydrAMINE 25 milliGRAM(s) Oral every 6 hours PRN Pruritus  lanolin Ointment 1 Application(s) Topical every 6 hours PRN Sore Nipples  magnesium hydroxide Suspension 30 milliLiter(s) Oral two times a day PRN Constipation  oxyCODONE    IR 5 milliGRAM(s) Oral every 3 hours PRN Moderate to Severe Pain (4-10)  oxyCODONE    IR 5 milliGRAM(s) Oral once PRN Moderate to Severe Pain (4-10)  simethicone 80 milliGRAM(s) Chew every 4 hours PRN Gas      PAST MEDICAL & SURGICAL HISTORY:  No pertinent past medical history  History of  section    Physical Exam  Vital Signs Last 24 Hrs  T(C): 35.9 (16 Sep 2021 03:33), Max: 36.8 (15 Sep 2021 23:24)  T(F): 96.6 (16 Sep 2021 03:33), Max: 98.2 (15 Sep 2021 23:24)  HR: 90 (16 Sep 2021 03:33) (90 - 99)  BP: 107/69 (16 Sep 2021 03:33) (107/69 - 122/57)  RR: 18 (16 Sep 2021 03:33) (18 - 18)    Gen: AAOx3, NAD  Heart: RRR  Lungs: CTAB  Fundus: firm, below umbilicus   Wound: steris in place, pfannenstiel incision c/d/i   Abd: Soft, appropriately tender near incision site, mildly distended, BS+  Lochia: minimal rubra   Ext: No calf tenderness, no swelling    Labs:                        11.9   13.28 )-----------( 216      ( 15 Sep 2021 06:59 )             38.7                         12.8   5.96  )-----------( 243      ( 14 Sep 2021 09:03 )             40.8

## 2021-09-16 NOTE — DISCHARGE NOTE NURSING/CASE MANAGEMENT/SOCIAL WORK - PATIENT PORTAL LINK FT
You can access the FollowMyHealth Patient Portal offered by [facility] by registering at the following website: [email or web address]. By joining [employer]’s FollowMyHealth portal, you will also be able to view your health information using other applications (apps) compatible with our system.

## 2021-09-16 NOTE — DISCHARGE NOTE OB - MEDICATION SUMMARY - MEDICATIONS TO TAKE
I will START or STAY ON the medications listed below when I get home from the hospital:    acetaminophen 325 mg oral tablet  -- 3 tab(s) by mouth every 6 hours   -- Indication: For pain    ibuprofen 600 mg oral tablet  -- 1 tab(s) by mouth every 6 hours  -- Indication: For pain    oxyCODONE 5 mg oral tablet  -- 1 tab(s) by mouth every 6 hours, As Needed -Moderate to Severe Pain (4-10) MDD:MDD: 4  -- Indication: For pain    Prenatal Multivitamins with Folic Acid 1 mg oral tablet  -- 1 tab(s) by mouth once a day  -- Indication: For posptartum    senna 8.6 mg oral tablet  -- 1 tab(s) by mouth 2 times a day   -- Indication: For constipation    simethicone 80 mg oral tablet, chewable  -- 1 tab(s) by mouth every 4 hours, As needed, Gas  -- Indication: For gas

## 2021-09-16 NOTE — DISCHARGE NOTE OB - PATIENT PORTAL LINK FT
You can access the FollowMyHealth Patient Portal offered by [facility] by registering at the following website: [email or web address]. By joining [employer]’s FollowMyHealth portal, you will also be able to view your health information using other applications (apps) compatible with our system.

## 2021-09-16 NOTE — PROGRESS NOTE ADULT - ASSESSMENT
A/P: 34yo now P2 POD#2 s/p repeat c/s(#2) with Mirena insertion with an EBL of 800cc, with gHTN, with blood pressure currently well controlled; doing well  - encourage ambulation  - encourage PO hydration  - encourage incentive spirometry use  - monitor vitals, bleeding   - simethicone & senna   - DVT ppx: lovenox & SCDs  - diet: regular   - pain mgmt prn   - VNS ordered for blood pressure monitoring at home for gestational hypertension   - discussed incisional and vaginal precautions  - discussed routine postpartum care expectations   - follow-up in one week for an incision check     Dr. Nicolas aware

## 2021-09-16 NOTE — PROGRESS NOTE ADULT - ATTENDING COMMENTS
POD#1 s/p 3rd c/s and mirena insertion, Pt doing well. Routine post op management.
p0d#2  doing well  bp stable  precautions given

## 2021-09-20 DIAGNOSIS — O36.63X0 MATERNAL CARE FOR EXCESSIVE FETAL GROWTH, THIRD TRIMESTER, NOT APPLICABLE OR UNSPECIFIED: ICD-10-CM

## 2021-09-20 DIAGNOSIS — O34.211 MATERNAL CARE FOR LOW TRANSVERSE SCAR FROM PREVIOUS CESAREAN DELIVERY: ICD-10-CM

## 2021-09-20 DIAGNOSIS — Z30.430 ENCOUNTER FOR INSERTION OF INTRAUTERINE CONTRACEPTIVE DEVICE: ICD-10-CM

## 2021-09-20 DIAGNOSIS — E66.01 MORBID (SEVERE) OBESITY DUE TO EXCESS CALORIES: ICD-10-CM

## 2021-09-20 DIAGNOSIS — Z3A.39 39 WEEKS GESTATION OF PREGNANCY: ICD-10-CM

## 2021-09-20 PROBLEM — Z78.9 OTHER SPECIFIED HEALTH STATUS: Chronic | Status: ACTIVE | Noted: 2021-09-14

## 2021-09-22 ENCOUNTER — OUTPATIENT (OUTPATIENT)
Dept: OUTPATIENT SERVICES | Facility: HOSPITAL | Age: 36
LOS: 1 days | Discharge: HOME | End: 2021-09-22

## 2021-09-22 ENCOUNTER — APPOINTMENT (OUTPATIENT)
Dept: OBGYN | Facility: CLINIC | Age: 36
End: 2021-09-22
Payer: MEDICAID

## 2021-09-22 VITALS
HEIGHT: 68 IN | SYSTOLIC BLOOD PRESSURE: 140 MMHG | WEIGHT: 225 LBS | BODY MASS INDEX: 34.1 KG/M2 | DIASTOLIC BLOOD PRESSURE: 74 MMHG

## 2021-09-22 DIAGNOSIS — Z98.891 HISTORY OF UTERINE SCAR FROM PREVIOUS SURGERY: Chronic | ICD-10-CM

## 2021-09-22 DIAGNOSIS — Z48.89 ENCOUNTER FOR OTHER SPECIFIED SURGICAL AFTERCARE: ICD-10-CM

## 2021-09-22 PROCEDURE — 99213 OFFICE O/P EST LOW 20 MIN: CPT

## 2021-09-22 NOTE — HISTORY OF PRESENT ILLNESS
[Postpartum Follow Up] : postpartum follow up [Last Pap Date: ___] : Last Pap Date: [unfilled] [Delivery Date: ___] : on [unfilled] [Repeat C/S] : delivered by  section (repeat) [Female] : Delivery History: baby girl [Wt. ___] : weighing [unfilled] [Breastfeeding] : currently nursing [Discharge HCT: ___] : hematocrit level was [unfilled] [Discharge HGB: ___] : hemoglobin level was [unfilled] [None] : No associated symptoms are reported [Clean/Dry/Intact] : clean, dry and intact [Back to Normal] : is back to normal in size [Mild] : mild vaginal bleeding [Normal] : the vagina was normal [IUD String] : had an IUD string protruding out [Doing Well] : is doing well [No [place]] : to avoid sexual intercourse [No Tampons/Suppositories] : against using tampons or vaginal suppositories [Limited ADLs] : to participate in activities of daily living with limitations [Limited Work] : to work with limitations [Limited Housework] : to do housework with limitations [No Sports] : not to participate in sports [Complications:___] : no complications [Erythema] : not erythematous [Swelling] : not swollen [Dehiscence] : not dehisced [Cervix Sample Taken] : cervical sample not taken for a Pap smear [de-identified] : Reports bilateral LE swelling no pain, Occasional headaches that improve with Ibuprofen, denies blurry vision, CP, SOB, RUQ/epigastric pain, .  [de-identified] : iud string seen protruding from cervical os [de-identified] : 4 weeks for regular pp visit

## 2021-10-20 ENCOUNTER — APPOINTMENT (OUTPATIENT)
Dept: OBGYN | Facility: CLINIC | Age: 36
End: 2021-10-20
Payer: MEDICAID

## 2021-10-20 ENCOUNTER — OUTPATIENT (OUTPATIENT)
Dept: OUTPATIENT SERVICES | Facility: HOSPITAL | Age: 36
LOS: 1 days | Discharge: HOME | End: 2021-10-20

## 2021-10-20 VITALS — BODY MASS INDEX: 32.99 KG/M2 | WEIGHT: 217 LBS | DIASTOLIC BLOOD PRESSURE: 74 MMHG | SYSTOLIC BLOOD PRESSURE: 120 MMHG

## 2021-10-20 DIAGNOSIS — Z98.891 HISTORY OF UTERINE SCAR FROM PREVIOUS SURGERY: Chronic | ICD-10-CM

## 2021-10-20 NOTE — HISTORY OF PRESENT ILLNESS
[Postpartum Follow Up] : postpartum follow up [Last Pap Date: ___] : Last Pap Date: [unfilled] [Delivery Date: ___] : on [unfilled] [Repeat C/S] : delivered by  section (repeat) [Female] : Delivery History: baby girl [Breastfeeding] : currently nursing [Intended Contraception] : Intended Contraception: [Clean/Dry/Intact] : clean, dry and intact [Normal] : the vagina was normal [Doing Well] : is doing well [No Sign of Infection] : is showing no signs of infection [Excellent Pain Control] : has excellent pain control [None] : None [Complications:___] : no complications [Resumed Menses] : has not resumed her menses [Resumed [place]] : has not resumed intercourse [FreeTextEntry8] : Postpartum follow up.  [de-identified] : Patient had right sided 1 inch reducible hernia. IUD strings visualized in pelvic exam.  [de-identified] : RTC in 1 year for annual. Patient counseled to follow with general surgery if hernia gets bigger or if she feels abdominal pain.

## 2021-10-27 ENCOUNTER — NON-APPOINTMENT (OUTPATIENT)
Age: 36
End: 2021-10-27

## 2021-10-29 ENCOUNTER — NON-APPOINTMENT (OUTPATIENT)
Age: 36
End: 2021-10-29

## 2021-11-02 ENCOUNTER — EMERGENCY (EMERGENCY)
Facility: HOSPITAL | Age: 36
LOS: 0 days | Discharge: HOME | End: 2021-11-02
Attending: EMERGENCY MEDICINE | Admitting: EMERGENCY MEDICINE
Payer: MEDICAID

## 2021-11-02 VITALS
RESPIRATION RATE: 20 BRPM | HEIGHT: 68 IN | HEART RATE: 91 BPM | TEMPERATURE: 98 F | DIASTOLIC BLOOD PRESSURE: 88 MMHG | OXYGEN SATURATION: 96 % | SYSTOLIC BLOOD PRESSURE: 134 MMHG

## 2021-11-02 DIAGNOSIS — Z98.891 HISTORY OF UTERINE SCAR FROM PREVIOUS SURGERY: Chronic | ICD-10-CM

## 2021-11-02 PROCEDURE — 99282 EMERGENCY DEPT VISIT SF MDM: CPT

## 2021-11-02 NOTE — ED PROVIDER NOTE - NS ED ROS FT
Constitutional: (-) fever (-) malaise (-) diaphoresis (-) chills (-) wt. loss (-) body aches (-) night sweats  Eyes: (-) visual changes (-) eye pain (-) eye discharge (-) photophobia (-) FB sensation  Cardiac: (-) chest pain  (-) palpitations (-) syncope (-) edema  Respiratory: (-) cough (-) SOB (-) [place]  GI: (-) nausea (-) vomiting (-) diarrhea (-) abdominal pain (-) hematochezia (+)hernia  : (-) dysuria (-) increased frequency  (-) hematuria (-) incontinence  Neuro: (-) headache (-) dizziness (-) numbness/tingling to extremities B/L (-) weakness   Skin: (-) rash (-) laceration  GYN: (-) pelvic pain (-) abnormal bleeding (-) abnormal discharge or odor   Except as documented in the HPI, all other systems are negative.

## 2021-11-02 NOTE — ED PROVIDER NOTE - CARE PROVIDER_API CALL
Rakan Nails)  Surgery  [address]  [place] [address]  Phone: [phone]  Fax: [phone]  Follow Up Time: 1-3 Days

## 2021-11-02 NOTE — ED PROVIDER NOTE - OBJECTIVE STATEMENT
34 yo F no pmhx  presenting to the ED for evaluation of periumbilical hernia x months, pt reports she has recent 36 yo F no pmhx  presenting to the ED for evaluation of periumbilical hernia x months, pt reports she had recent c section 21, Dr Ольга SALAZAR, pt reports prior to pregnancy and throughout pregnancy had R sided periumbilical hernia. Pt reports she is able to self reduce hernia, has never seen surgeon for evaluation. Pt denies any pain to site, lower abd cramping, nausea, vomiting, fever, chills, vaginal bleeding, discharge.

## 2021-11-02 NOTE — ED PROVIDER NOTE - PHYSICAL EXAMINATION
GENERAL: Well-nourished, Well-developed. NAD.  HEAD: No visible or palpable bumps or hematomas. No ecchymosis behind ears B/L.  Eyes: PERRLA, EOMI. No asymmetry. No nystagmus. No conjunctival injection. Non-icteric sclera.  ENMT: MMM.   Neck: Supple. FROM  CVS: Normal S1,S2. No murmurs appreciated on auscultation   RESP: Lungs clear to auscultation B/L. No wheezing, rales, or rhonchi auscultated.  GI: Normal auscultation of bowel sounds in all 4 quadrants. (+)R sided periumbilical hernia, soft, reducible at bedside. Soft, Nontender, Nondistended. No guarding or rebound tenderness. No CVAT B/L.  Skin: Warm, Dry. No rashes or lesions. Good cap refill < 2 sec B/L.  EXT: Radial and pedal pulses present B/L. No calf tenderness or swelling B/L. No palpable cords. No pedal edema B/L.  Neuro: AA&O x 3. Sensation grossly intact. Strength 5/5 B/L. Gait within normal limits.

## 2021-11-02 NOTE — ED PROVIDER NOTE - PATIENT PORTAL LINK FT
You can access the FollowMyHealth Patient Portal offered by [facility] by registering at the following website: [email or web address]. By joining [employer]’s FollowMyHealth portal, you will also be able to view your health information using other applications (apps) compatible with our system.

## 2021-11-02 NOTE — ED PROVIDER NOTE - CLINICAL SUMMARY MEDICAL DECISION MAKING FREE TEXT BOX
periumbilical hernia  no evid of strangulation/incarceration.  chronic.  easily reducible.  needs outpt f/u

## 2021-11-03 DIAGNOSIS — K42.9 UMBILICAL HERNIA WITHOUT OBSTRUCTION OR GANGRENE: ICD-10-CM

## 2021-11-11 ENCOUNTER — APPOINTMENT (OUTPATIENT)
Dept: SURGERY | Facility: CLINIC | Age: 36
End: 2021-11-11

## 2023-01-04 ENCOUNTER — OUTPATIENT (OUTPATIENT)
Dept: OUTPATIENT SERVICES | Facility: HOSPITAL | Age: 38
LOS: 1 days | Discharge: HOME | End: 2023-01-04

## 2023-01-04 ENCOUNTER — APPOINTMENT (OUTPATIENT)
Dept: OBGYN | Facility: CLINIC | Age: 38
End: 2023-01-04
Payer: MEDICAID

## 2023-01-04 VITALS — WEIGHT: 200 LBS | SYSTOLIC BLOOD PRESSURE: 120 MMHG | DIASTOLIC BLOOD PRESSURE: 70 MMHG | BODY MASS INDEX: 30.41 KG/M2

## 2023-01-04 DIAGNOSIS — Z98.891 HISTORY OF UTERINE SCAR FROM PREVIOUS SURGERY: Chronic | ICD-10-CM

## 2023-01-04 DIAGNOSIS — Z97.5 PRESENCE OF (INTRAUTERINE) CONTRACEPTIVE DEVICE: ICD-10-CM

## 2023-01-04 LAB
HCG UR QL: NEGATIVE
QUALITY CONTROL: YES

## 2023-01-04 PROCEDURE — 99213 OFFICE O/P EST LOW 20 MIN: CPT

## 2023-01-04 NOTE — DISCUSSION/SUMMARY
[FreeTextEntry1] : 36yo P2 with IUD in place, \par \par - counseled patient strings could be longer due to uterus florin down to original size but could not determine if IUD was in proper place, IUD could be less effective in [place]\par - referral for US declined, patient leaving for [facility] for 1 year tomorrow\par - upreg negative in office today\par - strings trimmed to 1 cm\par - RTC in 1 year or PRN

## 2023-01-04 NOTE — PHYSICAL EXAM
[Chaperone Present] : A chaperone was present in the examining room during all aspects of the physical examination [Appropriately responsive] : appropriately responsive [Labia Majora] : normal [Labia Minora] : normal [Normal] : normal [IUD String] : an IUD string was noted [FreeTextEntry1] : Hanny Ch [FreeTextEntry5] : blue IUD string initially 3 in outside of os, clipped to 1in with scissors, IUD body not visualized in the cervical os

## 2023-01-04 NOTE — HISTORY OF PRESENT ILLNESS
[FreeTextEntry1] : 38yo P2 s/p repeat  delivery on 21 with placement of Mirena IUD at that time. Patient reports that she has noticed that her IUD strings have been longer the past few months and she is scared of pulling the IUD out. Is does not cause pain with intercourse. She does not think that her partner feels the strings with intercourse. [No] : Patient does not have concerns regarding sex

## 2023-03-01 ENCOUNTER — APPOINTMENT (OUTPATIENT)
Dept: OBGYN | Facility: CLINIC | Age: 38
End: 2023-03-01

## 2023-08-22 NOTE — DISCHARGE NOTE OB - NSFOLLOWUPAPPTWD_ALL_CORE_SIUH
You have been recommended for home PT services.  The MD will provide a prescription for home PT, call one of the vendors listed ( or one of your choosing in your area) with your insurance information.  They will also need the origional prescription.   Rehab at home [phone]  [facility] [phone]  [facility] [phone]  [facility] [phone]  [facility] [phone]  You have been recommended for home PT services.  The MD will provide a prescription for home PT, call one of the vendors listed ( or one of your choosing in your area) with your insurance information.  They will also need the origional prescription.   Rehab at home [phone]  [facility] [phone]  [facility] [phone]  [facility] [phone]  [facility] [phone]  You have been recommended for home PT services.  The MD will provide a prescription for home PT, call one of the vendors listed ( or one of your choosing in your area) with your insurance information.  They will also need the origional prescription.   Rehab at home [phone]  [facility] [phone]  [facility] [phone]  [facility] [phone]  [facility] [phone]  1

## 2023-09-21 ENCOUNTER — APPOINTMENT (OUTPATIENT)
Dept: OBGYN | Facility: CLINIC | Age: 38
End: 2023-09-21
Payer: MEDICAID

## 2023-09-21 ENCOUNTER — OUTPATIENT (OUTPATIENT)
Dept: OUTPATIENT SERVICES | Facility: HOSPITAL | Age: 38
LOS: 1 days | End: 2023-09-21
Payer: MEDICAID

## 2023-09-21 VITALS
DIASTOLIC BLOOD PRESSURE: 88 MMHG | HEIGHT: 68 IN | BODY MASS INDEX: 31.07 KG/M2 | SYSTOLIC BLOOD PRESSURE: 141 MMHG | WEIGHT: 205 LBS

## 2023-09-21 DIAGNOSIS — Z01.419 ENCOUNTER FOR GYNECOLOGICAL EXAMINATION (GENERAL) (ROUTINE) WITHOUT ABNORMAL FINDINGS: ICD-10-CM

## 2023-09-21 DIAGNOSIS — Z00.00 ENCOUNTER FOR GENERAL ADULT MEDICAL EXAMINATION W/OUT ABNORMAL FINDINGS: ICD-10-CM

## 2023-09-21 DIAGNOSIS — Z23 ENCOUNTER FOR IMMUNIZATION: ICD-10-CM

## 2023-09-21 DIAGNOSIS — K42.9 UMBILICAL HERNIA W/OUT OBSTRUCTION OR GANGRENE: ICD-10-CM

## 2023-09-21 DIAGNOSIS — Z98.891 HISTORY OF UTERINE SCAR FROM PREVIOUS SURGERY: Chronic | ICD-10-CM

## 2023-09-21 PROCEDURE — 88142 CYTOPATH C/V THIN LAYER: CPT

## 2023-09-21 PROCEDURE — 87491 CHLMYD TRACH DNA AMP PROBE: CPT

## 2023-09-21 PROCEDURE — 87801 DETECT AGNT MULT DNA AMPLI: CPT

## 2023-09-21 PROCEDURE — 90471 IMMUNIZATION ADMIN: CPT

## 2023-09-21 PROCEDURE — 87798 DETECT AGENT NOS DNA AMP: CPT

## 2023-09-21 PROCEDURE — ZZZZZ: CPT

## 2023-09-21 PROCEDURE — 90651 9VHPV VACCINE 2/3 DOSE IM: CPT

## 2023-09-21 PROCEDURE — 99395 PREV VISIT EST AGE 18-39: CPT

## 2023-09-21 PROCEDURE — 87661 TRICHOMONAS VAGINALIS AMPLIF: CPT

## 2023-09-21 PROCEDURE — 87591 N.GONORRHOEAE DNA AMP PROB: CPT

## 2023-09-22 ENCOUNTER — OUTPATIENT (OUTPATIENT)
Dept: OUTPATIENT SERVICES | Facility: HOSPITAL | Age: 38
LOS: 1 days | End: 2023-09-22

## 2023-09-22 DIAGNOSIS — Z00.00 ENCOUNTER FOR GENERAL ADULT MEDICAL EXAMINATION WITHOUT ABNORMAL FINDINGS: ICD-10-CM

## 2023-09-22 DIAGNOSIS — Z98.891 HISTORY OF UTERINE SCAR FROM PREVIOUS SURGERY: Chronic | ICD-10-CM

## 2023-09-23 DIAGNOSIS — Z00.00 ENCOUNTER FOR GENERAL ADULT MEDICAL EXAMINATION WITHOUT ABNORMAL FINDINGS: ICD-10-CM

## 2023-09-27 DIAGNOSIS — K42.9 UMBILICAL HERNIA WITHOUT OBSTRUCTION OR GANGRENE: ICD-10-CM

## 2023-09-27 DIAGNOSIS — Z00.00 ENCOUNTER FOR GENERAL ADULT MEDICAL EXAMINATION WITHOUT ABNORMAL FINDINGS: ICD-10-CM

## 2023-09-27 DIAGNOSIS — Z23 ENCOUNTER FOR IMMUNIZATION: ICD-10-CM

## 2023-10-03 LAB — CYTOLOGY CVX/VAG DOC THIN PREP: NORMAL

## 2023-10-05 ENCOUNTER — TRANSCRIPTION ENCOUNTER (OUTPATIENT)
Age: 38
End: 2023-10-05

## 2023-11-30 ENCOUNTER — APPOINTMENT (OUTPATIENT)
Dept: OBGYN | Facility: CLINIC | Age: 38
End: 2023-11-30

## 2024-10-11 ENCOUNTER — LABORATORY RESULT (OUTPATIENT)
Age: 39
End: 2024-10-11

## 2025-01-08 NOTE — OB RN PATIENT PROFILE - PAIN SCALE PREFERRED, PROFILE
[place]ac Pregnancy And Lactation Text: This medication is Pregnancy Category X and contraindicated in pregnancy and in women who may become pregnant. It is unknown if this medication is excreted in breast milk. numerical 0-10

## 2025-04-09 ENCOUNTER — APPOINTMENT (OUTPATIENT)
Dept: OBGYN | Facility: CLINIC | Age: 40
End: 2025-04-09

## 2025-04-09 ENCOUNTER — OUTPATIENT (OUTPATIENT)
Dept: OUTPATIENT SERVICES | Facility: HOSPITAL | Age: 40
LOS: 1 days | End: 2025-04-09
Payer: COMMERCIAL

## 2025-04-09 VITALS
SYSTOLIC BLOOD PRESSURE: 122 MMHG | WEIGHT: 194 LBS | DIASTOLIC BLOOD PRESSURE: 80 MMHG | BODY MASS INDEX: 28.73 KG/M2 | HEIGHT: 69 IN

## 2025-04-09 DIAGNOSIS — Z01.419 ENCOUNTER FOR GYNECOLOGICAL EXAMINATION (GENERAL) (ROUTINE) W/OUT ABNORMAL FINDINGS: ICD-10-CM

## 2025-04-09 DIAGNOSIS — A63.0 ANOGENITAL (VENEREAL) WARTS: ICD-10-CM

## 2025-04-09 DIAGNOSIS — Z01.419 ENCOUNTER FOR GYNECOLOGICAL EXAMINATION (GENERAL) (ROUTINE) WITHOUT ABNORMAL FINDINGS: ICD-10-CM

## 2025-04-09 DIAGNOSIS — Z98.891 HISTORY OF UTERINE SCAR FROM PREVIOUS SURGERY: Chronic | ICD-10-CM

## 2025-04-09 PROCEDURE — 99395 PREV VISIT EST AGE 18-39: CPT | Mod: 25

## 2025-04-09 PROCEDURE — 99459 PELVIC EXAMINATION: CPT

## 2025-04-09 PROCEDURE — 56605 BIOPSY OF VULVA/PERINEUM: CPT

## 2025-04-09 PROCEDURE — 88305 TISSUE EXAM BY PATHOLOGIST: CPT

## 2025-04-09 PROCEDURE — 56501 DESTROY VULVA LESIONS SIM: CPT

## 2025-04-10 DIAGNOSIS — Z01.419 ENCOUNTER FOR GYNECOLOGICAL EXAMINATION (GENERAL) (ROUTINE) WITHOUT ABNORMAL FINDINGS: ICD-10-CM

## 2025-04-10 PROCEDURE — 88305 TISSUE EXAM BY PATHOLOGIST: CPT | Mod: 26

## 2025-04-11 LAB — CORE LAB BIOPSY: NORMAL

## 2025-06-11 ENCOUNTER — OUTPATIENT (OUTPATIENT)
Dept: OUTPATIENT SERVICES | Facility: HOSPITAL | Age: 40
LOS: 1 days | End: 2025-06-11
Payer: COMMERCIAL

## 2025-06-11 DIAGNOSIS — Z98.891 HISTORY OF UTERINE SCAR FROM PREVIOUS SURGERY: Chronic | ICD-10-CM

## 2025-06-11 DIAGNOSIS — M54.50 LOW BACK PAIN, UNSPECIFIED: ICD-10-CM

## 2025-06-11 PROCEDURE — 72100 X-RAY EXAM L-S SPINE 2/3 VWS: CPT

## 2025-06-11 PROCEDURE — 72100 X-RAY EXAM L-S SPINE 2/3 VWS: CPT | Mod: 26

## 2025-06-12 DIAGNOSIS — M54.50 LOW BACK PAIN, UNSPECIFIED: ICD-10-CM
